# Patient Record
(demographics unavailable — no encounter records)

---

## 2025-07-29 NOTE — ASSESSMENT
[FreeTextEntry1] : Assessment: Suspected Narcolepsy Type 1: The patient presents with a classic tetrad of excessive daytime sleepiness (Britt score 17), cataplexy (emotion-triggered episodes), frequent hypnagogic/hypnopompic hallucinations, and sleep paralysis. Rapid entry into REM sleep during naps further supports this diagnosis. No evidence of obstructive sleep apnea: Absence of snoring or witnessed apneas makes ALEX less likely, though it will be ruled out with polysomnography (PSG). Frequent awakenings: May be related to narcolepsy or could indicate another sleep disorder (e.g., insomnia), to be evaluated further with PSG.   Plan: Ordered polysomnography (PSG) followed by Multiple Sleep Latency Test (MSLT) to confirm narcolepsy diagnosis by assessing sleep latency and REM sleep onset (SOREMPs). PSG will also rule out other sleep disorders, such as ALEX or periodic limb movement disorder, contributing to fragmented sleep. Discussed narcolepsy as the likely diagnosis, including its chronic nature and management strategies. Encouraged power naps (10-20 minutes) to help manage daytime sleepiness. Discussed potential pharmacologic treatments, including Xywav (sodium oxybate) for cataplexy and excessive daytime sleepiness, and stimulants (e.g., modafinil, armodafinil) for wakefulness promotion. Risks, benefits, and side effects reviewed. Treatment initiation to be deferred until PSG/MSLT results confirm diagnosis.  Schedule follow-up in 4-6 weeks to review PSG/MSLT results and finalize treatment plan.

## 2025-07-29 NOTE — HISTORY OF PRESENT ILLNESS
[FreeTextEntry1] : Patient prefers she/her,  woman. 22 year old woman with no past medical history is here in the sleep center to address excessive daytime sleepiness. On hormonal therapy in the process of sex change.  Patient is sleepy with [place] sleepiness score of 17.  No matter how much sleep she gets, remains sleepy during the day.  Symptoms started a few years ago.  Does not have snoring or witnessed apneas. She is not sleepy while driving.  Patient's bedtime is around 11 PM wakes up in the morning around 8-9 AM, she has frequent awakenings.  Patient does not drink coffee everyday.  Patient does not have nocturia.  Has frequent hallucinations , has frequent sleep paralysis. She has history of cataplexy, has several episodes with any emotional disturbances. Went to ER as the last cataplexy episode scared the bystanders. She dreams even during a short nap.

## 2025-07-29 NOTE — REVIEW OF SYSTEMS
[Sleep Paralysis] : sleep paralysis [Cataplexy] : cataplexy [Hypnogogic Hallucinations] : hypnogogic hallucinations [Negative] : Psychiatric

## 2025-07-29 NOTE — HISTORY OF PRESENT ILLNESS
[FreeTextEntry1] : Patient prefers she/her,  woman. 22 year old woman with no past medical history is here in the sleep center to address excessive daytime sleepiness. On hormonal therapy in the process of sex change.  Patient is sleepy with [place] sleepiness score of 17.  No matter how much sleep she gets, remains sleepy during the day.  Symptoms started a few years ago.  Does not have snoring or witnessed apneas. She is not sleepy while driving.  Patient's bedtime is around 11 PM wakes up in the morning around 8-9 AM, she has frequent awakenings.  Patient does not drink coffee everyday.  Patient does not have nocturia.  Has frequent hallucinations , has frequent sleep paralysis. She has history of cataplexy, has several episodes with any emotional disturbances. Went to ER as the last cataplexy episode scared the bystanders. She dreams even during a short nap.

## 2025-07-29 NOTE — ASSESSMENT
[FreeTextEntry1] : Assessment: Suspected Narcolepsy Type 1: The patient presents with a classic tetrad of excessive daytime sleepiness ([place] score 17), cataplexy (emotion-triggered episodes), frequent hypnagogic/hypnopompic hallucinations, and sleep paralysis. Rapid entry into REM sleep during naps further supports this diagnosis. No evidence of obstructive sleep apnea: Absence of snoring or witnessed apneas makes ALEX less likely, though it will be ruled out with polysomnography (PSG). Frequent awakenings: May be related to narcolepsy or could indicate another sleep disorder (e.g., insomnia), to be evaluated further with PSG.   Plan: Ordered polysomnography (PSG) followed by Multiple Sleep Latency Test (MSLT) to confirm narcolepsy diagnosis by assessing sleep latency and REM sleep onset (SOREMPs). PSG will also rule out other sleep disorders, such as ALEX or periodic limb movement disorder, contributing to fragmented sleep. Discussed narcolepsy as the likely diagnosis, including its chronic nature and management strategies. Encouraged power naps (10-20 minutes) to help manage daytime sleepiness. Discussed potential pharmacologic treatments, including Xywav (sodium oxybate) for cataplexy and excessive daytime sleepiness, and stimulants (e.g., modafinil, armodafinil) for wakefulness promotion. Risks, benefits, and side effects reviewed. Treatment initiation to be deferred until PSG/MSLT results confirm diagnosis.  Schedule follow-up in 4-6 weeks to review PSG/MSLT results and finalize treatment plan.